# Patient Record
Sex: FEMALE | Race: WHITE | NOT HISPANIC OR LATINO | Employment: FULL TIME | URBAN - NONMETROPOLITAN AREA
[De-identification: names, ages, dates, MRNs, and addresses within clinical notes are randomized per-mention and may not be internally consistent; named-entity substitution may affect disease eponyms.]

---

## 2021-02-04 DIAGNOSIS — Z82.49 FAMILY HISTORY OF ISCHEMIC HEART DISEASE: ICD-10-CM

## 2021-02-04 DIAGNOSIS — R94.31 NONSPECIFIC ABNORMAL ELECTROCARDIOGRAM (ECG) (EKG): ICD-10-CM

## 2021-02-04 DIAGNOSIS — R01.1 CARDIAC MURMUR: Primary | ICD-10-CM

## 2021-03-01 ENCOUNTER — HOSPITAL ENCOUNTER (OUTPATIENT)
Dept: CARDIOLOGY | Facility: HOSPITAL | Age: 51
Discharge: HOME | End: 2021-03-01

## 2021-03-01 ENCOUNTER — HOSPITAL ENCOUNTER (OUTPATIENT)
Dept: NUCLEAR MEDICINE | Facility: HOSPITAL | Age: 51
Discharge: HOME | End: 2021-03-01

## 2021-03-01 DIAGNOSIS — Z82.49 FAMILY HISTORY OF ISCHEMIC HEART DISEASE: ICD-10-CM

## 2021-03-01 DIAGNOSIS — R01.1 CARDIAC MURMUR: ICD-10-CM

## 2021-03-01 DIAGNOSIS — R94.31 NONSPECIFIC ABNORMAL ELECTROCARDIOGRAM (ECG) (EKG): ICD-10-CM

## 2021-03-01 DIAGNOSIS — R07.9 CHEST PAIN, UNSPECIFIED TYPE: ICD-10-CM

## 2021-03-01 PROCEDURE — 0 TECHNETIUM SESTAMIBI: Performed by: INTERNAL MEDICINE

## 2021-03-01 PROCEDURE — 78452 HT MUSCLE IMAGE SPECT MULT: CPT

## 2021-03-01 PROCEDURE — A9500 TC99M SESTAMIBI: HCPCS | Performed by: INTERNAL MEDICINE

## 2021-03-01 PROCEDURE — 25010000002 REGADENOSON 0.4 MG/5ML SOLUTION: Performed by: INTERNAL MEDICINE

## 2021-03-01 PROCEDURE — 93017 CV STRESS TEST TRACING ONLY: CPT

## 2021-03-01 RX ORDER — ALBUTEROL SULFATE 90 UG/1
2 AEROSOL, METERED RESPIRATORY (INHALATION) EVERY 4 HOURS PRN
COMMUNITY

## 2021-03-01 RX ORDER — ATENOLOL 50 MG/1
50 TABLET ORAL 2 TIMES DAILY
COMMUNITY

## 2021-03-01 RX ORDER — TRAZODONE HYDROCHLORIDE 50 MG/1
50 TABLET ORAL NIGHTLY
COMMUNITY

## 2021-03-01 RX ORDER — SODIUM CHLORIDE 0.9 % (FLUSH) 0.9 %
10 SYRINGE (ML) INJECTION ONCE
Status: COMPLETED | OUTPATIENT
Start: 2021-03-01 | End: 2021-03-01

## 2021-03-01 RX ORDER — OLMESARTAN MEDOXOMIL AND HYDROCHLOROTHIAZIDE 40/12.5 40; 12.5 MG/1; MG/1
1 TABLET ORAL DAILY
COMMUNITY

## 2021-03-01 RX ORDER — BUSPIRONE HYDROCHLORIDE 15 MG/1
15 TABLET ORAL 2 TIMES DAILY
COMMUNITY

## 2021-03-01 RX ORDER — ALUMINA, MAGNESIA, AND SIMETHICONE 2400; 2400; 240 MG/30ML; MG/30ML; MG/30ML
SUSPENSION ORAL EVERY 6 HOURS PRN
COMMUNITY
End: 2021-03-01

## 2021-03-01 RX ORDER — CLONIDINE HYDROCHLORIDE 0.1 MG/1
0.1 TABLET ORAL AS NEEDED
COMMUNITY

## 2021-03-01 RX ORDER — AMLODIPINE BESYLATE 5 MG/1
5 TABLET ORAL DAILY
COMMUNITY

## 2021-03-01 RX ORDER — HYDROXYZINE HYDROCHLORIDE 25 MG/1
25 TABLET, FILM COATED ORAL 3 TIMES DAILY PRN
COMMUNITY

## 2021-03-01 RX ADMIN — TECHNETIUM TC 99M SESTAMIBI 1 DOSE: 1 INJECTION INTRAVENOUS at 09:09

## 2021-03-01 RX ADMIN — TECHNETIUM TC 99M SESTAMIBI 1 DOSE: 1 INJECTION INTRAVENOUS at 10:12

## 2021-03-01 RX ADMIN — REGADENOSON 0.4 MG: 0.08 INJECTION, SOLUTION INTRAVENOUS at 10:12

## 2021-03-01 RX ADMIN — SODIUM CHLORIDE, PRESERVATIVE FREE 10 ML: 5 INJECTION INTRAVENOUS at 10:12

## 2021-05-18 LAB
BH CV STRESS BP STAGE 1: NORMAL
BH CV STRESS COMMENTS STAGE 1: NORMAL
BH CV STRESS DOSE REGADENOSON STAGE 1: 0.4
BH CV STRESS DURATION MIN STAGE 1: 0
BH CV STRESS DURATION SEC STAGE 1: 10
BH CV STRESS HR STAGE 1: 92
BH CV STRESS PROTOCOL 1: NORMAL
BH CV STRESS RECOVERY BP: NORMAL MMHG
BH CV STRESS RECOVERY HR: 87 BPM
BH CV STRESS STAGE 1: 1
LV EF NUC BP: 77 %
MAXIMAL PREDICTED HEART RATE: 170 BPM
PERCENT MAX PREDICTED HR: 54.71 %
STRESS BASELINE BP: NORMAL MMHG
STRESS BASELINE HR: 64 BPM
STRESS PERCENT HR: 64 %
STRESS POST ESTIMATED WORKLOAD: 1 METS
STRESS POST PEAK BP: NORMAL MMHG
STRESS POST PEAK HR: 93 BPM
STRESS TARGET HR: 145 BPM

## 2022-04-06 ENCOUNTER — TRANSCRIBE ORDERS (OUTPATIENT)
Dept: ADMINISTRATIVE | Facility: HOSPITAL | Age: 52
End: 2022-04-06

## 2022-04-06 DIAGNOSIS — M54.40 MIDLINE LOW BACK PAIN WITH SCIATICA, SCIATICA LATERALITY UNSPECIFIED, UNSPECIFIED CHRONICITY: Primary | ICD-10-CM

## 2022-04-06 DIAGNOSIS — R20.0 NUMBNESS: ICD-10-CM

## 2022-04-06 DIAGNOSIS — R20.2 PARESTHESIA: ICD-10-CM

## 2022-05-10 ENCOUNTER — HOSPITAL ENCOUNTER (OUTPATIENT)
Dept: NEUROLOGY | Facility: HOSPITAL | Age: 52
Discharge: HOME | End: 2022-05-10
Admitting: FAMILY MEDICINE

## 2022-05-10 DIAGNOSIS — R20.2 PARESTHESIA: ICD-10-CM

## 2022-05-10 DIAGNOSIS — M54.40 MIDLINE LOW BACK PAIN WITH SCIATICA, SCIATICA LATERALITY UNSPECIFIED, UNSPECIFIED CHRONICITY: ICD-10-CM

## 2022-05-10 DIAGNOSIS — R20.0 NUMBNESS: ICD-10-CM

## 2022-05-10 PROCEDURE — 95886 MUSC TEST DONE W/N TEST COMP: CPT

## 2022-05-10 PROCEDURE — 95911 NRV CNDJ TEST 9-10 STUDIES: CPT

## 2022-10-03 ENCOUNTER — OFFICE VISIT (OUTPATIENT)
Dept: NEUROLOGY | Facility: CLINIC | Age: 52
End: 2022-10-03

## 2022-10-03 VITALS
OXYGEN SATURATION: 98 % | WEIGHT: 199 LBS | SYSTOLIC BLOOD PRESSURE: 138 MMHG | HEART RATE: 93 BPM | HEIGHT: 64 IN | DIASTOLIC BLOOD PRESSURE: 74 MMHG | BODY MASS INDEX: 33.97 KG/M2

## 2022-10-03 DIAGNOSIS — M54.16 LUMBAR RADICULOPATHY: Primary | ICD-10-CM

## 2022-10-03 PROCEDURE — 99204 OFFICE O/P NEW MOD 45 MIN: CPT | Performed by: PHYSICIAN ASSISTANT

## 2022-10-03 RX ORDER — ATORVASTATIN CALCIUM 10 MG/1
10 TABLET, FILM COATED ORAL DAILY
COMMUNITY
Start: 2022-08-22

## 2022-10-03 RX ORDER — GABAPENTIN 600 MG/1
600 TABLET ORAL 2 TIMES DAILY
COMMUNITY
Start: 2022-09-04

## 2022-10-03 RX ORDER — ALBUTEROL SULFATE 90 UG/1
POWDER, METERED RESPIRATORY (INHALATION)
COMMUNITY
Start: 2022-09-21

## 2022-10-03 RX ORDER — IBUPROFEN 800 MG/1
TABLET ORAL
COMMUNITY
Start: 2022-09-24

## 2022-10-03 RX ORDER — MONTELUKAST SODIUM 10 MG/1
10 TABLET ORAL DAILY
COMMUNITY
Start: 2022-08-04

## 2022-10-03 RX ORDER — BACLOFEN 10 MG/1
20 TABLET ORAL NIGHTLY
Qty: 60 TABLET | Refills: 1 | Status: SHIPPED | OUTPATIENT
Start: 2022-10-03

## 2022-10-03 NOTE — PROGRESS NOTES
"Subjective   Caty Ferrer is a 51 y.o. female who presents as a new patient referred with back and lower extremity pain. She is accompanied by her youngest daughter today.      History of Present Illness     The following portions of the patient's history were reviewed and updated as appropriate: allergies, current medications, past family history, past medical history, past social history, past surgical history and problem list.    Lower back pain  She has been having lower back pain on the left side. The pain radiates down her left lower extremity to her foot. She also has associated numbness and burning to the plantar aspect of her bilateral feet. The nerve pain radiates down her left lower extremity as well. She has a history of sciatic pain in the past which was treated by her primary care provider. Her primary care provider initially thought the pain was related to restless leg syndrome. She has a history of restless leg syndrome as a side effect from a medication in the past. She has tried physical therapy for a couple of months which was helpful. She was also placed on a traction machine which was not helpful. She had a lumbar x-ray which showed some plaque in the aorta which is why she is on a low dose of cholesterol medication. She denies any history of vascular issues. She has not had an MRI of her back due to her insurance not covering it until she had physical therapy. She has not tried going to a chiropractor. She has had an EMG nerve conduction study in the past. She has tried Flexeril in the past which was not helpful. She can sit on one of her hands and that will help relieve her pain. She has tried a massager on her back that shot a pain that felt like \"hot lava\" running down her leg. Currently, she is taking gabapentin 2 times daily. When she tries to walk up steps she states that her left foot will hang or drag behind her.     Hypertension  She has a history of hypertension for the past 12 " years. She follows with a cardiologist, Dr. Ray in [place], [place]. Her blood pressure was elevated to 220/115 mmHg which is why she was started on 5 different blood pressure medications. Her blood pressure was well controlled for about 9 months after starting the blood pressure medications. Her blood pressure is currently well controlled. She continues to have some low blood pressure readings.    Sleep  She is not sleeping well due to her back pain. She is unable to sleep in certain positions. She feels better if she sleeps in general. She has been told she snores when her allergies are acting up.    Medications  She is unable to take antidepressants. She has tried Cymbalta in the past which was not helpful for her. She has tried Prozac in the past which caused her to have withdrawal symptoms even while trying to wean down as prescribed. She has also tried Wellbutrin in the past which caused her to feel angry.    Surgical history  She had a hysterectomy several years ago.    Review of Systems:  A review of systems was performed, and positive findings are noted in the HPI.      Current Outpatient Medications:   •  albuterol sulfate  (90 Base) MCG/ACT inhaler, Inhale 2 puffs Every 4 (Four) Hours As Needed for Wheezing., Disp: , Rfl:   •  atenolol (TENORMIN) 50 MG tablet, Take 50 mg by mouth 2 (two) times a day., Disp: , Rfl:   •  busPIRone (BUSPAR) 15 MG tablet, Take 15 mg by mouth 2 (two) times a day., Disp: , Rfl:   •  gabapentin (NEURONTIN) 600 MG tablet, Take 600 mg by mouth 2 (Two) Times a Day., Disp: , Rfl:   •  hydrOXYzine (ATARAX) 25 MG tablet, Take 25 mg by mouth 3 (Three) Times a Day As Needed for Itching., Disp: , Rfl:   •  olmesartan-hydrochlorothiazide (BENICAR HCT) 40-12.5 MG per tablet, Take 1 tablet by mouth Daily., Disp: , Rfl:   •  traZODone (DESYREL) 50 MG tablet, Take 50 mg by mouth Every Night., Disp: , Rfl:   •  amLODIPine (NORVASC) 5 MG tablet, Take 5 mg by mouth Daily., Disp: ,  Rfl:   •  atorvastatin (LIPITOR) 10 MG tablet, Take 10 mg by mouth Daily., Disp: , Rfl:   •  baclofen (LIORESAL) 10 MG tablet, Take 2 tablets by mouth Every Night., Disp: 60 tablet, Rfl: 1  •  cloNIDine (CATAPRES) 0.1 MG tablet, Take 0.1 mg by mouth As Needed for High Blood Pressure., Disp: , Rfl:   •  ibuprofen (ADVIL,MOTRIN) 800 MG tablet, TAKE 1 TABLET BY MOUTH EVERY 8 HOURS AS NEEDED WITH FOOD OR MILK, Disp: , Rfl:   •  montelukast (SINGULAIR) 10 MG tablet, Take 10 mg by mouth Daily., Disp: , Rfl:   •  ProAir RespiClick 108 (90 Base) MCG/ACT inhaler, INHALE 1 PUFF BY MOUTH AS NEEDED EVERY 4 HOURS, Disp: , Rfl:      Objective   Physical Exam  Focused exam  The patient has tenderness along the mid lumbar spine, left worse than right. She has left sided sciatic notch tenderness. She does not have right sided sciatic notch tenderness. She has straight leg raising complaints at 70 degrees on the left side with a normal knee jerk reflex, normal ankle jerk reflex. She has diminished sensation in an L5 and S1 distribution on the left side as compared to the right and she has dorsiflexor weakness of the left foot as compared to the right. No other weakness is detected. Her gait is antalgic to the left. Her range of motion in the lumbar spine is significantly diminished both the flexion, but in particular more painful in extension.    Review of outside records includes a lumbar spine series which does not show any clear deficit per report, although, the films are not available for review. An EMG nerve conduction study does not reveal significant neuropathy, and the report is available for review.    RESULTS  Review of outside records includes a lumbar spine series which does not show any clear deficit per report, although the films are not available for review. An EMG nerve conduction study does not reveal significant neuropathy and the report is available for review.    Assessment & Plan   Diagnoses and all orders for  this visit:    1. Lumbar radiculopathy (Primary)  -     baclofen (LIORESAL) 10 MG tablet; Take 2 tablets by mouth Every Night.  Dispense: 60 tablet; Refill: 1  -     MRI Lumbar Spine Without Contrast; Future        1. Lumbar radiculopathy  - I feel that this patient's exam and history represents the presence of a left sided lumbar radiculopathy primarily in the L5 distribution with both motor and sensory components. She has had a long period of conservative treatment including appropriate medications and physical therapy as well as modified activities inclusive of discontinuing employment that she felt was aggravating her symptoms. She has not improved with these measures. Given the failure to improve and the presence of weakness and a exam consistent with a potential compressive radiculopathy in the lumbar spine, I feel that advanced imaging inclusive of an MRI of the lumbar spine without contrast is clearly indicated. This will be ordered. The patient will try to add Lioresal 10 to 20 mg at bedtime daily. She has asked me about increasing her gabapentin to a third dose per day. She is currently taking 600 mg twice per day. Medically, I have no issue with this. I feel like logistically she should probably discuss this with the referring physician, her family physician, Dr. Martinez, as the patient will likely need long term support from her to continue this medication. I also cautioned the patient that I would not use greater than 1800 mg/day. I offered to place the patient on Cymbalta in addition to the gabapentin, however, she has apparently had some issues with SSRI and other antidepressant medications, having significant irritability when placed on these and the inability to stay with them. This included at least Prozac and Remeron from her memory. Depending on findings on the MRI, either further referral for surgical consideration or potentially referral for pain management consideration.                Transcribed from ambient dictation for TELLY Ontiveros by Isabelle Cardenas.  10/03/22   14:59 CDT    Patient verbalized consent to the visit recording.  I have personally performed the services described in this document as transcribed by the above individual, and it is both accurate and complete.

## 2022-10-27 ENCOUNTER — HOSPITAL ENCOUNTER (OUTPATIENT)
Dept: MRI IMAGING | Facility: HOSPITAL | Age: 52
Discharge: HOME | End: 2022-10-27
Admitting: PHYSICIAN ASSISTANT

## 2022-10-27 DIAGNOSIS — M54.16 LUMBAR RADICULOPATHY: ICD-10-CM

## 2022-10-27 PROCEDURE — 72148 MRI LUMBAR SPINE W/O DYE: CPT

## 2022-11-07 ENCOUNTER — TELEPHONE (OUTPATIENT)
Dept: NEUROLOGY | Facility: CLINIC | Age: 52
End: 2022-11-07

## 2022-11-07 DIAGNOSIS — M54.16 LUMBAR RADICULOPATHY: Primary | ICD-10-CM

## 2022-11-07 NOTE — TELEPHONE ENCOUNTER
Detailed msg left with the patient regarding results. I explained that most recent scan did show some narrowing & pinching of nerves. I explained that Romero Maurice PA-C suggested a neurosurgery consult.     Pt is to call back to let me know if she would like to discuss further or to go ahead with consult.

## 2022-11-07 NOTE — TELEPHONE ENCOUNTER
Provider: TJ  Caller: PATIENT  Relationship to Patient: SELF  Pharmacy: N/A  Phone Number: [phone]  Reason for Call: PATIENT TELEPHONED RE: RESULTS OF RECENT IMAGING.    PLEASE CALL & ADVISE.    THANK YOU.

## 2022-11-18 ENCOUNTER — TELEPHONE (OUTPATIENT)
Dept: NEUROSURGERY | Facility: CLINIC | Age: 52
End: 2022-11-18

## 2022-11-18 NOTE — TELEPHONE ENCOUNTER
Pt LVM stating she fell when it snow and she  more times since and her left legs keeping buckling and she continues to have falls

## 2022-11-18 NOTE — TELEPHONE ENCOUNTER
Pt called back I informed her Varinder MEEKS was out snd Dr Fabian was booked for the rest of the week. Pt stated she would go back to her pcp pt stated she can't take early appts. Pts appt was moved to 12/19/22 at 11:45

## 2022-11-18 NOTE — TELEPHONE ENCOUNTER
Caller: Caty Ferrer    Relationship to patient: Self    Best call back number: [phone]    Chief complaint: PT FELL WHEN IT SNOWED A FEW WEEKS AGO. PT STATES SINCE THEN HER LEFT LEG KEEPS BUCKLING AND SHE CONTINUES TO HAVE FALLS. PT STATES PHY THERAPY WAS MAKING HER BACK PAIN WORSE.     Type of visit: NEW PT    Requested date: ASAP    If rescheduling, when is the original appointment: 12-21-22 WITH MERCEDEZ    Additional notes:PT WENT TO PCP AFTER 2ND TIME SHE FELL AND THEY GAVE HER STEROIDS. SINCE THEN SHE HAS FALLEN 2 MORE TIMES.         PLEASE CALL PT TO ADVISE IF OFFICE CAN SEE HER SOONER.

## 2022-12-14 ENCOUNTER — HOSPITAL ENCOUNTER (OUTPATIENT)
Dept: GENERAL RADIOLOGY | Facility: HOSPITAL | Age: 52
Discharge: HOME | End: 2022-12-14
Admitting: NURSE PRACTITIONER

## 2022-12-14 ENCOUNTER — OFFICE VISIT (OUTPATIENT)
Dept: NEUROSURGERY | Facility: CLINIC | Age: 52
End: 2022-12-14

## 2022-12-14 VITALS — WEIGHT: 199.4 LBS | HEIGHT: 64 IN | BODY MASS INDEX: 34.04 KG/M2

## 2022-12-14 DIAGNOSIS — M54.41 CHRONIC BILATERAL LOW BACK PAIN WITH BILATERAL SCIATICA: ICD-10-CM

## 2022-12-14 DIAGNOSIS — G89.29 CHRONIC BILATERAL LOW BACK PAIN WITH BILATERAL SCIATICA: ICD-10-CM

## 2022-12-14 DIAGNOSIS — F17.210 CIGARETTE SMOKER: ICD-10-CM

## 2022-12-14 DIAGNOSIS — M54.42 CHRONIC BILATERAL LOW BACK PAIN WITH BILATERAL SCIATICA: ICD-10-CM

## 2022-12-14 DIAGNOSIS — E66.09 CLASS 1 OBESITY DUE TO EXCESS CALORIES WITHOUT SERIOUS COMORBIDITY WITH BODY MASS INDEX (BMI) OF 34.0 TO 34.9 IN ADULT: ICD-10-CM

## 2022-12-14 DIAGNOSIS — M51.37 DEGENERATION OF LUMBAR OR LUMBOSACRAL INTERVERTEBRAL DISC: Primary | ICD-10-CM

## 2022-12-14 DIAGNOSIS — M51.37 DEGENERATION OF LUMBAR OR LUMBOSACRAL INTERVERTEBRAL DISC: ICD-10-CM

## 2022-12-14 PROCEDURE — 99204 OFFICE O/P NEW MOD 45 MIN: CPT | Performed by: NURSE PRACTITIONER

## 2022-12-14 PROCEDURE — 72114 X-RAY EXAM L-S SPINE BENDING: CPT

## 2022-12-14 RX ORDER — GUAIFENESIN 600 MG/1
600 TABLET ORAL EVERY 12 HOURS PRN
COMMUNITY
Start: 2022-12-01

## 2022-12-14 NOTE — PROGRESS NOTES
Chief complaint:   Chief Complaint   Patient presents with   • Back Pain     Pt here for constant LBP. Pt has numbness and tingling in bilateral legs and feet. Pt states she has completed physical therapy unsuccessful. Pt has not seen a chiropractor or been to pain mgmt.        Subjective     HPI: This is a 52-year-old female patient who was referred by TELLY Aquino for back pain and bilateral leg pain.  She is here for follow-up today.  Patient says that she has been having some back and leg issues since January.  The pain has been progressively getting worse per the pain in her back is constant.  Is worse with twisting and bending and better with heat.  She has pain that radiates into her legs bilaterally with the left being worse than the right that goes down her legs in a posterior and lateral radicular fashion to her feet.  The pain in the legs is constant.  It is worse with walking and better with standing.  Denies any bowel or bladder incontinence.  She has been through physical therapy without any significant improvement.  She has not any chiropractic care or pain management injections.  She has tried prescription strength NSAIDs in the past however these did make her sick and was not able to tolerate them.  She currently is taking baclofen and gabapentin to help with her pain.  She works as a .  She does smoke cigarettes.  She does drink alcohol.  Denies any illicit drug use.  She is single.    Review of Systems   Constitutional: Positive for activity change.   HENT: Positive for postnasal drip and sinus pressure.    Respiratory: Positive for wheezing.    Musculoskeletal: Positive for back pain.   All other systems reviewed and are negative.       Past Medical History:   Diagnosis Date   • Anxiety    • COPD (chronic obstructive pulmonary disease) (HCC)    • Hyperlipidemia    • Hypertension    • Low back pain      Past Surgical History:   Procedure Laterality Date   • HYSTERECTOMY    "    History reviewed. No pertinent family history.  Social History     Tobacco Use   • Smoking status: Every Day     Packs/day: 1.00     Years: 38.00     Pack years: 38.00     Types: Cigarettes     Start date: 1984   • Smokeless tobacco: Never   Vaping Use   • Vaping Use: Never used   Substance Use Topics   • Alcohol use: Defer   • Drug use: Never     (Not in a hospital admission)    Allergies:  Prozac [fluoxetine hcl] and Demerol [meperidine]    Objective      Vital Signs  Ht 162.6 cm (64\")   Wt 90.4 kg (199 lb 6.4 oz)   BMI 34.23 kg/m²     Physical Exam  Constitutional:       Appearance: Normal appearance. She is well-developed.   HENT:      Head: Normocephalic.   Eyes:      General: Lids are normal.      Extraocular Movements: EOM normal.      Conjunctiva/sclera: Conjunctivae normal.      Pupils: Pupils are equal, round, and reactive to light.   Cardiovascular:      Rate and Rhythm: Normal rate and regular rhythm.   Pulmonary:      Effort: Pulmonary effort is normal.      Breath sounds: Normal breath sounds.   Musculoskeletal:         General: Normal range of motion.      Cervical back: Normal range of motion.   Skin:     General: Skin is warm.   Neurological:      Mental Status: She is alert and oriented to person, place, and time.      GCS: GCS eye subscore is 4. GCS verbal subscore is 5. GCS motor subscore is 6.      Cranial Nerves: No cranial nerve deficit.      Sensory: No sensory deficit.      Motor: Motor strength is normal.      Gait: Gait is intact.      Deep Tendon Reflexes: Reflexes are normal and symmetric. Reflexes normal.   Psychiatric:         Speech: Speech normal.         Behavior: Behavior normal.         Thought Content: Thought content normal.         Neurologic Exam     Mental Status   Oriented to person, place, and time.   Attention: normal. Concentration: normal.   Speech: speech is normal   Level of consciousness: alert  Normal comprehension.     Cranial Nerves     CN II   Visual fields " full to confrontation.     CN III, IV, VI   Pupils are equal, round, and reactive to light.  Extraocular motions are normal.     CN V   Facial sensation intact.     CN VII   Facial expression full, symmetric.     CN VIII   CN VIII normal.     CN IX, X   CN IX normal.   CN X normal.     CN XI   CN XI normal.     CN XII   CN XII normal.     Motor Exam   Muscle bulk: normal    Strength   Strength 5/5 throughout.     Sensory Exam   Light touch normal.     Gait, Coordination, and Reflexes     Gait  Gait: normal    Reflexes   Reflexes 2+ except as noted.       Imaging review: MRI of the lumbar spine that was done on October 22, 2022 shows disc degeneration from L3-S1.  At L5-S1 there is bilateral foraminal narrowing and facet arthropathy with the right being worse than the left.  At L4-5 lumbar stenosis with bilateral foraminal narrowing.  At L3-4 severe facet arthropathy is noted with bilateral foraminal narrowing and mild to moderate lumbar stenosis.  No fracture visualized.  No cord signal change.  The conus does terminate at L1.    L3-4      L4-5      L5-S1        Assessment/Plan: I am going to send the patient for set x-rays of the lumbar spine to include standing flexion and extension.  We will also make referral to pain management.  We will have the patient follow-up with Dr. Fabian to see how she is doing from the injections and make further recommendation based off the new imaging to see if the patient would benefit from any surgical intervention versus continue with conservative management.  Her questions and concerns were addressed  Patient is a nonsmoker  The patient's Body mass index is 34.23 kg/m².. BMI is above normal parameters. Recommendations include: educational material and nutrition counseling    Diagnoses and all orders for this visit:    1. Degeneration of lumbar or lumbosacral intervertebral disc (Primary)  -     XR Spine Lumbar Complete With Flex & Ext; Future  -     Ambulatory Referral to Pain  Management    2. Chronic bilateral low back pain with bilateral sciatica  -     XR Spine Lumbar Complete With Flex & Ext; Future  -     Ambulatory Referral to Pain Management    3. Class 1 obesity due to excess calories without serious comorbidity with body mass index (BMI) of 34.0 to 34.9 in adult    4. Cigarette smoker          I discussed the patients findings and my recommendations with patient    Varinder Mijares, APRN  12/15/22  08:21 CST

## 2022-12-14 NOTE — PATIENT INSTRUCTIONS
For more information:    Quit Now [place]  1-800-QUIT-NOW  https://[place].quitlogix.org/en-[place]/  Steps to Quit Smoking  Smoking tobacco can be harmful to your health and can affect almost every organ in your body. Smoking puts you, and those around you, at risk for developing many serious chronic diseases. Quitting smoking is difficult, but it is one of the best things that you can do for your health. It is never too late to quit.  What are the benefits of quitting smoking?  When you quit smoking, you lower your risk of developing serious diseases and conditions, such as:  Lung cancer or lung disease, such as COPD.  Heart disease.  Stroke.  Heart attack.  Infertility.  Osteoporosis and bone fractures.  Additionally, symptoms such as coughing, wheezing, and shortness of breath may get better when you quit. You may also find that you get sick less often because your body is stronger at fighting off colds and infections. If you are pregnant, quitting smoking can help to reduce your chances of having a baby of low birth weight.  How do I get ready to quit?  When you decide to quit smoking, create a plan to make sure that you are successful. Before you quit:  Pick a date to quit. Set a date within the next two weeks to give you time to prepare.  Write down the reasons why you are quitting. Keep this list in places where you will see it often, such as on your bathroom mirror or in your car or wallet.  Identify the people, places, things, and activities that make you want to smoke (triggers) and avoid them. Make sure to take these actions:  Throw away all cigarettes at home, at work, and in your car.  Throw away smoking accessories, such as ashtrays and lighters.  Clean your car and make sure to empty the ashtray.  Clean your home, including curtains and carpets.  Tell your family, friends, and coworkers that you are quitting. Support from your loved ones can make quitting easier.  Talk with your health care provider  about your options for quitting smoking.  Find out what treatment options are covered by your health insurance.  What strategies can I use to quit smoking?  Talk with your healthcare provider about different strategies to quit smoking. Some strategies include:  Quitting smoking altogether instead of gradually lessening how much you smoke over a period of time. Research shows that quitting “cold turkey” is more successful than gradually quitting.  Attending in-person counseling to help you build problem-solving skills. You are more likely to have success in quitting if you attend several counseling sessions. Even short sessions of 10 minutes can be effective.  Finding resources and support systems that can help you to quit smoking and remain smoke-free after you quit. These resources are most helpful when you use them often. They can include:  Online chats with a counselor.  Telephone quitlines.  Printed self-help materials.  Support groups or group counseling.  Text messaging programs.  Mobile phone applications.  Taking medicines to help you quit smoking. (If you are pregnant or breastfeeding, talk with your health care provider first.) Some medicines contain nicotine and some do not. Both types of medicines help with cravings, but the medicines that include nicotine help to relieve withdrawal symptoms. Your health care provider may recommend:  Nicotine patches, gum, or lozenges.  Nicotine inhalers or sprays.  Non-nicotine medicine that is taken by mouth.  Talk with your health care provider about combining strategies, such as taking medicines while you are also receiving in-person counseling. Using these two strategies together makes you more likely to succeed in quitting than if you used either strategy on its own.  If you are pregnant or breastfeeding, talk with your health care provider about finding counseling or other support strategies to quit smoking. Do not take medicine to help you quit smoking unless  told to do so by your health care provider.  What things can I do to make it easier to quit?  Quitting smoking might feel overwhelming at first, but there is a lot that you can do to make it easier. Take these important actions:  Reach out to your family and friends and ask that they support and encourage you during this time. Call telephone quitlines, reach out to support groups, or work with a counselor for support.  Ask people who smoke to avoid smoking around you.  Avoid places that trigger you to smoke, such as bars, parties, or smoke-break areas at work.  Spend time around people who do not smoke.  Lessen stress in your life, because stress can be a smoking trigger for some people. To lessen stress, try:  Exercising regularly.  Deep-breathing exercises.  Yoga.  Meditating.  Performing a body scan. This involves closing your eyes, scanning your body from head to toe, and noticing which parts of your body are particularly tense. Purposefully relax the muscles in those areas.  Download or purchase mobile phone or tablet apps (applications) that can help you stick to your quit plan by providing reminders, tips, and encouragement. There are many free apps, such as QuitGuide from the CDC (Centers for Disease Control and Prevention). You can find other support for quitting smoking (smoking cessation) through smokefree.gov and other websites.  How will I feel when I quit smoking?  Within the first 24 hours of quitting smoking, you may start to feel some withdrawal symptoms. These symptoms are usually most noticeable 2-3 days after quitting, but they usually do not last beyond 2-3 weeks. Changes or symptoms that you might experience include:  Mood swings.  Restlessness, anxiety, or irritation.  Difficulty concentrating.  Dizziness.  Strong cravings for sugary foods in addition to nicotine.  Mild weight gain.  Constipation.  Nausea.  Coughing or a sore throat.  Changes in how your medicines work in your body.  A  depressed mood.  Difficulty sleeping (insomnia).  After the first 2-3 weeks of quitting, you may start to notice more positive results, such as:  Improved sense of smell and taste.  Decreased coughing and sore throat.  Slower heart rate.  Lower blood pressure.  Clearer skin.  The ability to breathe more easily.  Fewer sick days.  Quitting smoking is very challenging for most people. Do not get discouraged if you are not successful the first time. Some people need to make many attempts to quit before they achieve long-term success. Do your best to stick to your quit plan, and talk with your health care provider if you have any questions or concerns.  This information is not intended to replace advice given to you by your health care provider. Make sure you discuss any questions you have with your health care provider.  Document Released: 12/12/2002 Document Revised: 08/15/2017 Document Reviewed: 05/03/2016  [employer] Patient Education © 2017 [employer]  BMI for Adults  What is BMI?  Body mass index (BMI) is a number that is calculated from a person's weight and height. BMI can help estimate how much of a person's weight is composed of fat. BMI does not measure body fat directly. Rather, it is an alternative to procedures that directly measure body fat, which can be difficult and expensive.  BMI can help identify people who may be at higher risk for certain medical problems.  What are BMI measurements used for?  BMI is used as a screening tool to identify possible weight problems. It helps determine whether a person is obese, overweight, a healthy weight, or underweight.  BMI is useful for:  Identifying a weight problem that may be related to a medical condition or may increase the risk for medical problems.  Promoting changes, such as changes in diet and exercise, to help reach a healthy weight. BMI screening can be repeated to see if these changes are working.  How is BMI calculated?  BMI involves  "measuring your weight in relation to your height. Both height and weight are measured, and the BMI is calculated from those numbers. This can be done either in English ([place] or metric measurements. Note that charts and online BMI calculators are available to help you find your BMI quickly and easily without having to do these calculations yourself.  To calculate your BMI in English (U.S[place] measurements:    Measure your weight in pounds (lb).  Multiply the number of pounds by 703.  For example, for a person who weighs 180 lb, multiply that number by 703, which equals 126,540.  Measure your height in inches. Then multiply that number by itself to get a measurement called \"inches squared.\"  For example, for a person who is 70 inches tall, the \"inches squared\" measurement is 70 inches x 70 inches, which equals 4,900 inches squared.  Divide the total from step 2 (number of lb x 703) by the total from step 3 (inches squared): 126,540 ÷ 4,900 = 25.8. This is your BMI.    To calculate your BMI in metric measurements:  Measure your weight in kilograms (kg).  Measure your height in meters (m). Then multiply that number by itself to get a measurement called \"meters squared.\"  For example, for a person who is 1.75 m tall, the \"meters squared\" measurement is 1.75 m x 1.75 m, which is equal to 3.1 meters squared.  Divide the number of kilograms (your weight) by the meters squared number. In this example: 70 ÷ 3.1 = 22.6. This is your BMI.  What do the results mean?  BMI charts are used to identify whether you are underweight, normal weight, overweight, or obese. The following guidelines will be used:  Underweight: BMI less than 18.5.  Normal weight: BMI between 18.5 and 24.9.  Overweight: BMI between 25 and 29.9.  Obese: BMI of 30 or above.  Keep these notes in mind:  Weight includes both fat and muscle, so someone with a muscular build, such as an athlete, may have a BMI that is higher than 24.9. In cases like these, BMI is not " an accurate measure of body fat.  To determine if excess body fat is the cause of a BMI of 25 or higher, further assessments may need to be done by a health care provider.  BMI is usually interpreted in the same way for men and women.  Where to find more information  For more information about BMI, including tools to quickly calculate your BMI, go to these websites:  Centers for Disease Control and Prevention: www.cdc.gov  American Heart Association: www.heart.org  National Heart, Lung, and Blood [place]: www.nhlbi.nih.gov  Summary  Body mass index (BMI) is a number that is calculated from a person's weight and height.  BMI may help estimate how much of a person's weight is composed of fat. BMI can help identify those who may be at higher risk for certain medical problems.  BMI can be measured using English measurements or metric measurements.  BMI charts are used to identify whether you are underweight, normal weight, overweight, or obese.  This information is not intended to replace advice given to you by your health care provider. Make sure you discuss any questions you have with your health care provider.  Document Revised: 09/09/2020 Document Reviewed: 07/17/2020  [employer] Patient Education © 2021 [employer]

## 2022-12-15 ENCOUNTER — PATIENT ROUNDING (BHMG ONLY) (OUTPATIENT)
Dept: NEUROSURGERY | Facility: CLINIC | Age: 52
End: 2022-12-15

## 2023-04-17 ENCOUNTER — TELEPHONE (OUTPATIENT)
Dept: NEUROSURGERY | Facility: CLINIC | Age: 53
End: 2023-04-17
Payer: COMMERCIAL

## 2023-04-17 NOTE — TELEPHONE ENCOUNTER
Attempted to contact patient to confirm her appt with Dr Higuera on 4/25/23 @ 12:30 pm; however, she is not accepting calls at this time.  I sent a My Chart message.    JESSICA PONCE [facility]  PHYSICIAN LEAD  DR AVRIL HIGUERA  [facility]

## 2023-04-20 NOTE — TELEPHONE ENCOUNTER
I called patient back today and she states she needs to reschedule her appt due to moving and just now getting in to see pain mgmt.  I have given her a new appt for 8/8/23.    JESSICA PONCE [facility]  PHYSICIAN LEAD  DR AVRIL HIGUERA  [facility]

## 2024-04-10 ENCOUNTER — TRANSCRIBE ORDERS (OUTPATIENT)
Dept: ADMINISTRATIVE | Facility: HOSPITAL | Age: 54
End: 2024-04-10
Payer: COMMERCIAL

## 2024-04-10 ENCOUNTER — HOSPITAL ENCOUNTER (OUTPATIENT)
Dept: GENERAL RADIOLOGY | Facility: HOSPITAL | Age: 54
Discharge: HOME | End: 2024-04-10
Admitting: NURSE PRACTITIONER
Payer: COMMERCIAL

## 2024-04-10 DIAGNOSIS — M79.7 SCAPULOHUMERAL FIBROSITIS: ICD-10-CM

## 2024-04-10 DIAGNOSIS — M25.451 RIGHT HIP JOINT EFFUSION: ICD-10-CM

## 2024-04-10 DIAGNOSIS — G89.29 OTHER CHRONIC PAIN: ICD-10-CM

## 2024-04-10 DIAGNOSIS — M47.816 LUMBAR SPONDYLOSIS: ICD-10-CM

## 2024-04-10 DIAGNOSIS — M47.816 LUMBAR SPONDYLOSIS: Primary | ICD-10-CM

## 2024-04-10 DIAGNOSIS — M54.16 LUMBAR RADICULOPATHY: ICD-10-CM

## 2024-04-10 DIAGNOSIS — Z79.891 ENCOUNTER FOR LONG-TERM METHADONE USE: ICD-10-CM

## 2024-04-10 PROCEDURE — 73502 X-RAY EXAM HIP UNI 2-3 VIEWS: CPT
